# Patient Record
Sex: MALE | Race: WHITE
[De-identification: names, ages, dates, MRNs, and addresses within clinical notes are randomized per-mention and may not be internally consistent; named-entity substitution may affect disease eponyms.]

---

## 2019-11-09 ENCOUNTER — HOSPITAL ENCOUNTER (INPATIENT)
Dept: HOSPITAL 83 - ED | Age: 74
LOS: 3 days | Discharge: OTHER INSTITUTION | DRG: 553 | End: 2019-11-12
Attending: INTERNAL MEDICINE | Admitting: INTERNAL MEDICINE
Payer: MEDICARE

## 2019-11-09 VITALS — SYSTOLIC BLOOD PRESSURE: 112 MMHG | DIASTOLIC BLOOD PRESSURE: 59 MMHG

## 2019-11-09 VITALS — SYSTOLIC BLOOD PRESSURE: 142 MMHG | DIASTOLIC BLOOD PRESSURE: 58 MMHG

## 2019-11-09 VITALS — DIASTOLIC BLOOD PRESSURE: 60 MMHG | SYSTOLIC BLOOD PRESSURE: 150 MMHG

## 2019-11-09 VITALS — DIASTOLIC BLOOD PRESSURE: 51 MMHG | SYSTOLIC BLOOD PRESSURE: 135 MMHG

## 2019-11-09 VITALS — WEIGHT: 228.5 LBS | BODY MASS INDEX: 33.84 KG/M2 | HEIGHT: 68.98 IN

## 2019-11-09 DIAGNOSIS — E11.22: ICD-10-CM

## 2019-11-09 DIAGNOSIS — I48.0: ICD-10-CM

## 2019-11-09 DIAGNOSIS — E11.65: ICD-10-CM

## 2019-11-09 DIAGNOSIS — Z51.5: ICD-10-CM

## 2019-11-09 DIAGNOSIS — Z79.01: ICD-10-CM

## 2019-11-09 DIAGNOSIS — Z88.1: ICD-10-CM

## 2019-11-09 DIAGNOSIS — Z90.49: ICD-10-CM

## 2019-11-09 DIAGNOSIS — D64.9: ICD-10-CM

## 2019-11-09 DIAGNOSIS — Z95.1: ICD-10-CM

## 2019-11-09 DIAGNOSIS — M10.9: Primary | ICD-10-CM

## 2019-11-09 DIAGNOSIS — Z66: ICD-10-CM

## 2019-11-09 DIAGNOSIS — N17.0: ICD-10-CM

## 2019-11-09 DIAGNOSIS — Z79.899: ICD-10-CM

## 2019-11-09 DIAGNOSIS — Z88.8: ICD-10-CM

## 2019-11-09 DIAGNOSIS — J96.11: ICD-10-CM

## 2019-11-09 DIAGNOSIS — Z87.891: ICD-10-CM

## 2019-11-09 DIAGNOSIS — R53.1: ICD-10-CM

## 2019-11-09 DIAGNOSIS — Z83.2: ICD-10-CM

## 2019-11-09 DIAGNOSIS — Z79.82: ICD-10-CM

## 2019-11-09 DIAGNOSIS — R26.2: ICD-10-CM

## 2019-11-09 DIAGNOSIS — Z95.0: ICD-10-CM

## 2019-11-09 DIAGNOSIS — I25.2: ICD-10-CM

## 2019-11-09 DIAGNOSIS — E03.9: ICD-10-CM

## 2019-11-09 DIAGNOSIS — I50.9: ICD-10-CM

## 2019-11-09 DIAGNOSIS — E78.5: ICD-10-CM

## 2019-11-09 DIAGNOSIS — N18.3: ICD-10-CM

## 2019-11-09 DIAGNOSIS — E66.9: ICD-10-CM

## 2019-11-09 DIAGNOSIS — Z88.6: ICD-10-CM

## 2019-11-09 DIAGNOSIS — I13.0: ICD-10-CM

## 2019-11-09 DIAGNOSIS — I25.119: ICD-10-CM

## 2019-11-09 DIAGNOSIS — Z85.89: ICD-10-CM

## 2019-11-09 LAB
ALBUMIN SERPL-MCNC: 3.2 GM/DL (ref 3.1–4.5)
ALP SERPL-CCNC: 86 U/L (ref 45–117)
ALT SERPL W P-5'-P-CCNC: 16 U/L (ref 12–78)
AST SERPL-CCNC: 19 IU/L (ref 3–35)
BASOPHILS # BLD AUTO: 0 10*3/UL (ref 0–0.1)
BASOPHILS NFR BLD AUTO: 0.2 % (ref 0–1)
BUN SERPL-MCNC: 42 MG/DL (ref 7–24)
CHLORIDE SERPL-SCNC: 102 MMOL/L (ref 98–107)
CREAT SERPL-MCNC: 1.68 MG/DL (ref 0.7–1.3)
EOSINOPHIL # BLD AUTO: 0.1 10*3/UL (ref 0–0.4)
EOSINOPHIL # BLD AUTO: 1 % (ref 1–4)
ERYTHROCYTE [DISTWIDTH] IN BLOOD BY AUTOMATED COUNT: 15 % (ref 0–14.5)
HCT VFR BLD AUTO: 32.8 % (ref 42–52)
HGB BLD-MCNC: 10 G/DL (ref 14–18)
LYMPHOCYTES # BLD AUTO: 0.9 10*3/UL (ref 1.3–4.4)
LYMPHOCYTES NFR BLD AUTO: 9.1 % (ref 27–41)
MCH RBC QN AUTO: 27.9 PG (ref 27–31)
MCHC RBC AUTO-ENTMCNC: 30.5 G/DL (ref 33–37)
MCV RBC AUTO: 91.4 FL (ref 80–94)
MONOCYTES # BLD AUTO: 1.1 10*3/UL (ref 0.1–1)
MONOCYTES NFR BLD MANUAL: 11.9 % (ref 3–9)
NEUT #: 7.4 10*3/UL (ref 2.3–7.9)
NEUT %: 77.3 % (ref 47–73)
NRBC BLD QL AUTO: 0 10*3/UL (ref 0–0)
PLATELET # BLD AUTO: 199 10*3/UL (ref 130–400)
PMV BLD AUTO: 11.1 FL (ref 9.6–12.3)
POTASSIUM SERPL-SCNC: 4.7 MMOL/L (ref 3.5–5.1)
PROT SERPL-MCNC: 7.1 GM/DL (ref 6.4–8.2)
RBC # BLD AUTO: 3.59 10*6/UL (ref 4.5–5.9)
SODIUM SERPL-SCNC: 136 MMOL/L (ref 136–145)
WBC NRBC COR # BLD AUTO: 9.6 10*3/UL (ref 4.8–10.8)

## 2019-11-09 NOTE — NUR
MSADMTime: N
A 74  year old MALE admitted to 4E
under services of ARELI MOJICA DO.
Pt. arrived via bed from
ER.  Chief complaint: KNEE SWELLING/PAIN.
 
JEAN PIERRE HILL

## 2019-11-09 NOTE — NUR
PATIENT RESTING IN BED WITH EYES OPEN WATCHING TV. DENIES COMPLAINTS OF PAIN
OR DISCOMFORT. RESPIRATIONS REGULAR AND NON-LABORED ON 3L O2 VIA N/C. LUNGS
DIMINISHED BILATERALLY. BED ALARM ON. NO SIGNS OR SYMPTOMS OF DISTRESS NOTED.
VITAL SIGNS STABLE. ABDOMEN SOFTLY DISTENDED WITH NORMOACTIVE BOWEL SOUNDS. 3+
BILATERAL LOWER EXTREMITY EDEMA NOTED. WILL CONTINUE TO MONITOR. CALL LIGHT IN
REACH.

## 2019-11-10 VITALS — SYSTOLIC BLOOD PRESSURE: 112 MMHG | DIASTOLIC BLOOD PRESSURE: 49 MMHG

## 2019-11-10 VITALS — DIASTOLIC BLOOD PRESSURE: 55 MMHG | SYSTOLIC BLOOD PRESSURE: 120 MMHG

## 2019-11-10 VITALS — SYSTOLIC BLOOD PRESSURE: 114 MMHG | DIASTOLIC BLOOD PRESSURE: 53 MMHG

## 2019-11-10 VITALS — SYSTOLIC BLOOD PRESSURE: 117 MMHG | DIASTOLIC BLOOD PRESSURE: 62 MMHG

## 2019-11-10 VITALS — DIASTOLIC BLOOD PRESSURE: 50 MMHG

## 2019-11-10 LAB
25(OH)D3 SERPL-MCNC: 19.7 NG/ML (ref 30–100)
ALBUMIN SERPL-MCNC: 2.9 GM/DL (ref 3.1–4.5)
ALP SERPL-CCNC: 81 U/L (ref 45–117)
ALT SERPL W P-5'-P-CCNC: 17 U/L (ref 12–78)
APTT PPP: 56.9 SECONDS (ref 20–32.1)
AST SERPL-CCNC: 20 IU/L (ref 3–35)
BASOPHILS # BLD AUTO: 0 10*3/UL (ref 0–0.1)
BASOPHILS NFR BLD AUTO: 0.3 % (ref 0–1)
BUN SERPL-MCNC: 41 MG/DL (ref 7–24)
CHLORIDE SERPL-SCNC: 101 MMOL/L (ref 98–107)
CHOLEST SERPL-MCNC: 101 MG/DL (ref ?–200)
CREAT SERPL-MCNC: 1.62 MG/DL (ref 0.7–1.3)
EOSINOPHIL # BLD AUTO: 0.3 10*3/UL (ref 0–0.4)
EOSINOPHIL # BLD AUTO: 4.1 % (ref 1–4)
ERYTHROCYTE [DISTWIDTH] IN BLOOD BY AUTOMATED COUNT: 15.2 % (ref 0–14.5)
HCT VFR BLD AUTO: 30.3 % (ref 42–52)
HDLC SERPL-MCNC: 35 MG/DL (ref 40–60)
HGB BLD-MCNC: 9.3 G/DL (ref 14–18)
INR BLD: 2 (ref 2–3.5)
LDLC SERPL DIRECT ASSAY-MCNC: 43 MG/DL (ref 9–159)
LYMPHOCYTES # BLD AUTO: 1 10*3/UL (ref 1.3–4.4)
LYMPHOCYTES NFR BLD AUTO: 12.9 % (ref 27–41)
MCH RBC QN AUTO: 28.4 PG (ref 27–31)
MCHC RBC AUTO-ENTMCNC: 30.7 G/DL (ref 33–37)
MCV RBC AUTO: 92.7 FL (ref 80–94)
MONOCYTES # BLD AUTO: 1.1 10*3/UL (ref 0.1–1)
MONOCYTES NFR BLD MANUAL: 13.8 % (ref 3–9)
NEUT #: 5.4 10*3/UL (ref 2.3–7.9)
NEUT %: 68.5 % (ref 47–73)
NRBC BLD QL AUTO: 0 10*3/UL (ref 0–0)
PHOSPHATE SERPL-MCNC: 3.5 MG/DL (ref 2.5–4.9)
PLATELET # BLD AUTO: 202 10*3/UL (ref 130–400)
PMV BLD AUTO: 11.3 FL (ref 9.6–12.3)
POTASSIUM SERPL-SCNC: 4.7 MMOL/L (ref 3.5–5.1)
PROT SERPL-MCNC: 6.8 GM/DL (ref 6.4–8.2)
RBC # BLD AUTO: 3.27 10*6/UL (ref 4.5–5.9)
SODIUM SERPL-SCNC: 137 MMOL/L (ref 136–145)
T4 FREE SERPL-MCNC: 1.18 NG/DL (ref 0.76–1.46)
TRIGL SERPL-MCNC: 114 MG/DL (ref ?–150)
TSH SERPL DL<=0.005 MIU/L-ACNC: 3.47 UIU/ML (ref 0.36–4.75)
VITAMIN B12: 465 PG/ML (ref 247–911)
VLDLC SERPL CALC-MCNC: 23 MG/DL (ref 6–40)
WBC NRBC COR # BLD AUTO: 7.9 10*3/UL (ref 4.8–10.8)

## 2019-11-10 NOTE — NUR
PATIENT RESTING IN BED WITH EYES OPEN WATCHING TV. O2 ON AT 3L N/C.
RESPIRATIONS REGULAR AND NON-LABORED. IV FLUIDS INFUSING AT 100CC/HR X1 BAG.
DENIES COMPLAINTS OF PAIN OR DISCOMFORT AT THIS TIME. BED ALARM ON. PATIENT
USES URINAL AND IS ALSO INCONTINENT OF URINE. NO SIGNS OR SYMPTOMS OF DISTRESS
NOTED. WILL CONTINUE TO MONITOR. CALL LIGHT IN REACH.

## 2019-11-11 VITALS — DIASTOLIC BLOOD PRESSURE: 60 MMHG | SYSTOLIC BLOOD PRESSURE: 133 MMHG

## 2019-11-11 VITALS — DIASTOLIC BLOOD PRESSURE: 61 MMHG | SYSTOLIC BLOOD PRESSURE: 145 MMHG

## 2019-11-11 VITALS — DIASTOLIC BLOOD PRESSURE: 61 MMHG | SYSTOLIC BLOOD PRESSURE: 151 MMHG

## 2019-11-11 VITALS — DIASTOLIC BLOOD PRESSURE: 49 MMHG

## 2019-11-11 VITALS — SYSTOLIC BLOOD PRESSURE: 145 MMHG | DIASTOLIC BLOOD PRESSURE: 50 MMHG

## 2019-11-11 NOTE — NUR
PATIENT REFUSED TO LET ME DO A BEDSIDE GLUCOSE TEST ON HIM. SAID HE IS DONE
WITH EVERYTHING, THAT WE ARE DOING NOTHING FOR HIM AND HE IS LEAVING TODAY
BECAUSE HE IS JUST DETERIORATING AND GETTING WORSE. MEDICATED WTIH MORPHINE
FOR BACK PAIN AND BREATHING. WILL CONTINUE TO MONITOR. CALL LIGHT IN REACH.

## 2019-11-11 NOTE — NUR
PHYSICAL THERAPY
 
Nursing screen received and chart reviewed. Physical therapy order received
and completed. Thank you. Kecia Rowell,PT,DPT

## 2019-11-11 NOTE — NUR
Occupational Therapy evaluation completed on 4 with full eval to follow.
Precautions include fall risk,bed alarm,unsteady in standing, ww use for
transfers,right knee pain/edema,high complexity level 63007. Recommend OT per
POC and SNF to enable return home w/ wife at max ability to function. Thank
you.
Joann Sow OTR/l

## 2019-11-11 NOTE — NUR
Patient requesting a referral to [facility], contacted facility and faxed referral.
Requires a 3 night stay; waiting on review/acceptance.

## 2019-11-11 NOTE — NUR
PT REQUESTING "SLEEPING PILL" MEDICATED PER ORDER. WILL CONTINUE TO MONITOR
FOR RELIEF. VOICES NO OTHER CONCERNS AT THIS TIME. RESTING IN BED. CALL LIGHT
WITHIN REACH

## 2019-11-11 NOTE — NUR
in to talk to patient.
Patient states lives at home with wife.
There are few steps in the home.
Physician: benjamin limon
Pharmacy: [facility]: none
Patient's level of ADLs: MINIMAL ASSIST
Patient has working utilities: all working
DME:  home oxygen, portable tanks from [employer] [facility] [place], walker
Follow-up physician's appointment after d/c: will be made by hospitalist nurse
director upon discharge
Does patient want to access PORTAL?: no
Discharge plan discussed with patient, wife present, he lives at home with
wife, uses a walker for ambulation and requires minimal assistanc with adls,
wife stated lately he has be unable to ambulate, discussed with them a short
term skilled nursing for rehab and they both were in agreement,  patient stated
he had been at [facility] a few months ago and would like to return, asked for a
second choice also and they chose [facility] discharge planner
will make referrals, case management will follow.
 
OTILIO CERDA

## 2019-11-11 NOTE — NUR
OT NOTE
Attempted to see pt this P.M. for OT session and upon arrival pt was sitting
upright in the recliner while pt's wife was assisting with self care.
Requesting to check back at a later time/date, no treatment provided at this
time. Will continue with POC as able.
 
ADAM Savage/HUBERT

## 2019-11-11 NOTE — NUR
PHYSICAL THERAPY
PT leidy completed full report to follow, reccomend SNF at discharge discussed
with pt and wife. Pt is moderate level of [id number]. PT to work on
transfers, amb, strengthening, and ROM.
Thank you.
Joselin Valentin PT

## 2019-11-12 VITALS — DIASTOLIC BLOOD PRESSURE: 55 MMHG | SYSTOLIC BLOOD PRESSURE: 136 MMHG

## 2019-11-12 VITALS — DIASTOLIC BLOOD PRESSURE: 63 MMHG

## 2019-11-12 NOTE — NUR
Patient sleeping. Respirations relaxed and easy. Siderails up . Wheellocks
on. Bed alarm on. Call light within reach.
 
HISSOM,FUNMILAYO

## 2019-11-12 NOTE — NUR
Patient resting quietly in bed
with no c/o discomfort. Respirations easy and regular.
Vital signs stable. No overt distress. Bed alarm on. Call light within reach.
FUNMILAYO AREVALO

## 2019-11-12 NOTE — NUR
patient is discharged to [facility]; transportation scheduled for 12 noon with [facility]. NH, nursing/mcmanus clerk and wife all notified.

## 2019-11-12 NOTE — NUR
PHYSICAL THERAPY
 
Patient seen this am 1:1 for therapy visit and was sitting up in bedside chair
upon therapist arrival. Patient identified by name /  and presented with
continuos O2-3L via NC.  Patient voices no new c/o's and performed sit to
stand transfer from low chair surface with MOD A. Patient demonstrated
increased difficulty upon initial rise secondary to POOR transfer technique
and needed v/c for proper hand placement. Patient improved to MIN A upon
second trial and ambulated with use of wh walker, CGA, 40'x 1, demonstrating
"waddling" gait pattern, decreased stride and unsteady balance during all
turns. Patient was also very cautious for fear of falling and returned to
bedside chair with mild fatigue. Patient remained in chair with call light,
tray table and telephone. Will continue per POC as tolerated, total treatment
time 14 minutes.  Francis Stout, PTA

## 2019-11-12 NOTE — NUR
Patient has been accepted to [facility], 3 night stay complete. Patient can go when
medically stable for discharge.

## 2019-11-12 NOTE — NUR
Discharge instructions reviewed with patient/family. Patient receptive and
verbalizes understanding. Follow-up care arranged. Written instructions given
to patient/family.
TASHA JENSEN

## 2019-11-12 NOTE — NUR
OT NOTE
Pt was seen this A.M. 1:1 for 20 minute OT session. Upon arrival pt was
sitting upright in the recliner. Pt identified by name and  and had no
complaints at this time. Pt presented to therapy with continuous 3L-O2 via NC
which he remained on throughout the entire session. Pt completed sit to stand
transfer from chair level with modA and use of w/w for UE support. Educated
pt on proper hand placement and improving his technique for increased I.
Challenged pt's static standing tolerance needed for increased I in self care
tasks and functional transfers, pt was able to tolerate aprox 3 mnutes at a
time before sitting due to fatigue. Pt then completed functional mobility to
the bathroom and back with CGA and use of w/w. Throughout pt required verbal
prompts for taking bigger steps and was educated on safe turning technique due
to LOB occuring to the R while turning that required Jaden to correct. Pt
required two standing rest breaks throughout due to fatigue. Pt was left
sitting upright in the recliner with call light in hand, tray table in place,
and phone in reach. COntinue with rec D/C plan to SNF.
 
ADAM Savage/L

## 2019-11-12 NOTE — NUR
case management visits with patient, he will be discharged to [facility] today,
discharge planner will make transportation arrangements, no other needs at
this time

## 2019-11-12 NOTE — NUR
SECOND ATTEMPT TO GIVE REPORT WAS SENT TO VOICEMAIL. [employer]AIL LEFT WITH
CALLBACK NUMBER ON "SUPERVISOR [employer]IOR'S" LINE.

## 2019-11-13 NOTE — NUR
OCCUPATIONAL THERAPY CO-SIGN
 
I approve of the Occupational Therapy notes written above.
REBECCA TOMAS OTR/HUBERT

## 2019-11-13 NOTE — NUR
PHYSICAL THERAPY CO-SIGN
 
 
I approve of the Physical Therapy notes written above.
 
Joselin Valentin PT

## 2020-02-29 ENCOUNTER — HOSPITAL ENCOUNTER (INPATIENT)
Dept: HOSPITAL 83 - ED | Age: 75
LOS: 4 days | Discharge: OTHER INSTITUTION | DRG: 177 | End: 2020-03-04
Attending: INTERNAL MEDICINE | Admitting: INTERNAL MEDICINE
Payer: MEDICARE

## 2020-02-29 VITALS — SYSTOLIC BLOOD PRESSURE: 136 MMHG | DIASTOLIC BLOOD PRESSURE: 66 MMHG

## 2020-02-29 VITALS — DIASTOLIC BLOOD PRESSURE: 47 MMHG

## 2020-02-29 VITALS — WEIGHT: 216.31 LBS | BODY MASS INDEX: 30.97 KG/M2 | HEIGHT: 70 IN

## 2020-02-29 VITALS — DIASTOLIC BLOOD PRESSURE: 80 MMHG

## 2020-02-29 VITALS — DIASTOLIC BLOOD PRESSURE: 40 MMHG | SYSTOLIC BLOOD PRESSURE: 110 MMHG

## 2020-02-29 VITALS — DIASTOLIC BLOOD PRESSURE: 53 MMHG

## 2020-02-29 VITALS — SYSTOLIC BLOOD PRESSURE: 131 MMHG | DIASTOLIC BLOOD PRESSURE: 51 MMHG

## 2020-02-29 VITALS — DIASTOLIC BLOOD PRESSURE: 54 MMHG

## 2020-02-29 DIAGNOSIS — E53.8: ICD-10-CM

## 2020-02-29 DIAGNOSIS — Z84.89: ICD-10-CM

## 2020-02-29 DIAGNOSIS — E11.22: ICD-10-CM

## 2020-02-29 DIAGNOSIS — I13.0: ICD-10-CM

## 2020-02-29 DIAGNOSIS — D68.59: ICD-10-CM

## 2020-02-29 DIAGNOSIS — E66.9: ICD-10-CM

## 2020-02-29 DIAGNOSIS — E11.65: ICD-10-CM

## 2020-02-29 DIAGNOSIS — J43.9: ICD-10-CM

## 2020-02-29 DIAGNOSIS — I25.2: ICD-10-CM

## 2020-02-29 DIAGNOSIS — Z66: ICD-10-CM

## 2020-02-29 DIAGNOSIS — I25.10: ICD-10-CM

## 2020-02-29 DIAGNOSIS — I50.9: ICD-10-CM

## 2020-02-29 DIAGNOSIS — E03.9: ICD-10-CM

## 2020-02-29 DIAGNOSIS — Z90.49: ICD-10-CM

## 2020-02-29 DIAGNOSIS — E87.5: ICD-10-CM

## 2020-02-29 DIAGNOSIS — N18.3: ICD-10-CM

## 2020-02-29 DIAGNOSIS — Z87.891: ICD-10-CM

## 2020-02-29 DIAGNOSIS — Z95.1: ICD-10-CM

## 2020-02-29 DIAGNOSIS — E44.0: ICD-10-CM

## 2020-02-29 DIAGNOSIS — J96.21: ICD-10-CM

## 2020-02-29 DIAGNOSIS — I48.0: ICD-10-CM

## 2020-02-29 DIAGNOSIS — Z51.5: ICD-10-CM

## 2020-02-29 DIAGNOSIS — D64.9: ICD-10-CM

## 2020-02-29 DIAGNOSIS — R26.2: ICD-10-CM

## 2020-02-29 DIAGNOSIS — Z88.8: ICD-10-CM

## 2020-02-29 DIAGNOSIS — Z88.6: ICD-10-CM

## 2020-02-29 DIAGNOSIS — Z79.01: ICD-10-CM

## 2020-02-29 DIAGNOSIS — N17.0: ICD-10-CM

## 2020-02-29 DIAGNOSIS — Z99.81: ICD-10-CM

## 2020-02-29 DIAGNOSIS — Z85.89: ICD-10-CM

## 2020-02-29 DIAGNOSIS — Z91.81: ICD-10-CM

## 2020-02-29 DIAGNOSIS — I48.21: ICD-10-CM

## 2020-02-29 DIAGNOSIS — Z79.899: ICD-10-CM

## 2020-02-29 DIAGNOSIS — Z79.82: ICD-10-CM

## 2020-02-29 DIAGNOSIS — Z88.1: ICD-10-CM

## 2020-02-29 DIAGNOSIS — J15.6: Primary | ICD-10-CM

## 2020-02-29 LAB
ALBUMIN SERPL-MCNC: 3 GM/DL (ref 3.1–4.5)
ALP SERPL-CCNC: 116 U/L (ref 45–117)
ALT SERPL W P-5'-P-CCNC: 19 U/L (ref 12–78)
APTT PPP: 65.9 SECONDS (ref 20–32.1)
AST SERPL-CCNC: 19 IU/L (ref 3–35)
BASOPHILS # BLD AUTO: 0 10*3/UL (ref 0–0.1)
BASOPHILS NFR BLD AUTO: 0.1 % (ref 0–1)
BUN SERPL-MCNC: 57 MG/DL (ref 7–24)
CHLORIDE SERPL-SCNC: 106 MMOL/L (ref 98–107)
CREAT SERPL-MCNC: 2.23 MG/DL (ref 0.7–1.3)
EOSINOPHIL # BLD AUTO: 0.2 10*3/UL (ref 0–0.4)
EOSINOPHIL # BLD AUTO: 2.9 % (ref 1–4)
ERYTHROCYTE [DISTWIDTH] IN BLOOD BY AUTOMATED COUNT: 15.9 % (ref 0–14.5)
HCT VFR BLD AUTO: 29.4 % (ref 42–52)
HGB BLD-MCNC: 8.6 G/DL (ref 14–18)
INR BLD: 2.6 (ref 2–3.5)
LYMPHOCYTES # BLD AUTO: 1 10*3/UL (ref 1.3–4.4)
LYMPHOCYTES NFR BLD AUTO: 13 % (ref 27–41)
MCH RBC QN AUTO: 26.8 PG (ref 27–31)
MCHC RBC AUTO-ENTMCNC: 29.3 G/DL (ref 33–37)
MCV RBC AUTO: 91.6 FL (ref 80–94)
MONOCYTES # BLD AUTO: 0.8 10*3/UL (ref 0.1–1)
MONOCYTES NFR BLD MANUAL: 11.1 % (ref 3–9)
NEUT #: 5.3 10*3/UL (ref 2.3–7.9)
NEUT %: 72.2 % (ref 47–73)
NRBC BLD QL AUTO: 0 % (ref 0–0)
PLATELET # BLD AUTO: 261 10*3/UL (ref 130–400)
PMV BLD AUTO: 10.3 FL (ref 9.6–12.3)
POTASSIUM SERPL-SCNC: 5.2 MMOL/L (ref 3.5–5.1)
PROT SERPL-MCNC: 6.9 GM/DL (ref 6.4–8.2)
RBC # BLD AUTO: 3.21 10*6/UL (ref 4.5–5.9)
SODIUM SERPL-SCNC: 138 MMOL/L (ref 136–145)
TROPONIN I SERPL-MCNC: < 0.015 NG/ML (ref ?–0.04)
WBC NRBC COR # BLD AUTO: 7.3 10*3/UL (ref 4.8–10.8)

## 2020-02-29 NOTE — NUR
Time: 1520
A 74  year old MALE admitted to 5E
under services of DAVIDSON HESS DO.
Pt. arrived via being carried from
ER.  Chief complaint: CHF.
 
MARIA TERESA HUBBARD

## 2020-02-29 NOTE — NUR
PLACED PT ON NC 4L TO SEE IF OXYGEN WOULD STAY GOOD BUT WITHIN 5 MINUTES
OXYGEN WAS BACK DOWN TO 88 NO REBREATHER RE-APPLIED FAMILY AT BEDSIDE

## 2020-02-29 NOTE — NUR
PATIENT SITTING UP IN BED AT THIS TIME. PATIENT DENIES ANY SHORTNESS OF
BREATHE WHILE ON THE OPTIFLOW. PATIENT HAS 3+ BLE NOTED AND REPORTS BREATHING
HAD BEEN WORSENING FOR OVER A WEEK. PATIENT HAS A PEREZ WITH RC URINE
OBSERVED. NO PAIN OR DISTRESS NOTED THROUGHOUT ASSESSMENT.
CALL LIGHT WITHIN REACH. SEE ASSESSMENT

## 2020-02-29 NOTE — NUR
PT WIFE REQUESTED PEREZ CATH BE PLACED HE IS GOING TO BE GETTING IV LASIX  IS
AWARE AND OK WITH PEREZ BEING PUT IN

## 2020-03-01 VITALS — SYSTOLIC BLOOD PRESSURE: 103 MMHG | DIASTOLIC BLOOD PRESSURE: 47 MMHG

## 2020-03-01 VITALS — SYSTOLIC BLOOD PRESSURE: 113 MMHG | DIASTOLIC BLOOD PRESSURE: 42 MMHG

## 2020-03-01 VITALS — DIASTOLIC BLOOD PRESSURE: 39 MMHG

## 2020-03-01 VITALS — SYSTOLIC BLOOD PRESSURE: 127 MMHG | DIASTOLIC BLOOD PRESSURE: 50 MMHG

## 2020-03-01 LAB
25(OH)D3 SERPL-MCNC: 27.9 NG/ML (ref 30–100)
ALBUMIN SERPL-MCNC: 2.8 GM/DL (ref 3.1–4.5)
ALP SERPL-CCNC: 93 U/L (ref 45–117)
ALT SERPL W P-5'-P-CCNC: 16 U/L (ref 12–78)
AST SERPL-CCNC: 22 IU/L (ref 3–35)
BASE EXCESS BLDA CALC-SCNC: 2.2 MMOL/L (ref -2–2)
BASOPHILS # BLD AUTO: 0 10*3/UL (ref 0–0.1)
BASOPHILS NFR BLD AUTO: 0.1 % (ref 0–1)
BUN SERPL-MCNC: 53 MG/DL (ref 7–24)
CHLORIDE SERPL-SCNC: 106 MMOL/L (ref 98–107)
CHOLEST SERPL-MCNC: 74 MG/DL (ref ?–200)
CREAT SERPL-MCNC: 1.77 MG/DL (ref 0.7–1.3)
EOSINOPHIL # BLD AUTO: 0.3 10*3/UL (ref 0–0.4)
EOSINOPHIL # BLD AUTO: 4.4 % (ref 1–4)
ERYTHROCYTE [DISTWIDTH] IN BLOOD BY AUTOMATED COUNT: 15.8 % (ref 0–14.5)
HCO3 BLDA-SCNC: 27 MMOL/L (ref 22–26)
HCT VFR BLD AUTO: 27.1 % (ref 42–52)
HDLC SERPL-MCNC: 32 MG/DL (ref 40–60)
HGB BLD-MCNC: 8 G/DL (ref 14–18)
LDLC SERPL DIRECT ASSAY-MCNC: 23 MG/DL (ref 9–159)
LYMPHOCYTES # BLD AUTO: 0.8 10*3/UL (ref 1.3–4.4)
LYMPHOCYTES NFR BLD AUTO: 11.7 % (ref 27–41)
MCH RBC QN AUTO: 26.8 PG (ref 27–31)
MCHC RBC AUTO-ENTMCNC: 29.5 G/DL (ref 33–37)
MCV RBC AUTO: 90.9 FL (ref 80–94)
MONOCYTES # BLD AUTO: 0.9 10*3/UL (ref 0.1–1)
MONOCYTES NFR BLD MANUAL: 13 % (ref 3–9)
NEUT #: 4.8 10*3/UL (ref 2.3–7.9)
NEUT %: 70.5 % (ref 47–73)
NRBC BLD QL AUTO: 0 10*3/UL (ref 0–0)
PCO2 BLDA: 44 MMHG (ref 35–45)
PH BLDA: 7.4 [PH] (ref 7.35–7.45)
PHOSPHATE SERPL-MCNC: 3.5 MG/DL (ref 2.5–4.9)
PLATELET # BLD AUTO: 265 10*3/UL (ref 130–400)
PMV BLD AUTO: 11.2 FL (ref 9.6–12.3)
PO2 BLDA: 98 MMHG (ref 80–90)
POTASSIUM SERPL-SCNC: 4.7 MMOL/L (ref 3.5–5.1)
PROT SERPL-MCNC: 6.2 GM/DL (ref 6.4–8.2)
RBC # BLD AUTO: 2.98 10*6/UL (ref 4.5–5.9)
SAO2 % BLDA: 98 % (ref 95–97)
SODIUM SERPL-SCNC: 140 MMOL/L (ref 136–145)
T4 FREE SERPL-MCNC: 1.33 NG/DL (ref 0.76–1.46)
TRIGL SERPL-MCNC: 96 MG/DL (ref ?–150)
TSH SERPL DL<=0.005 MIU/L-ACNC: 0.41 UIU/ML (ref 0.36–4.75)
VITAMIN B12: 435 PG/ML (ref 247–911)
VLDLC SERPL CALC-MCNC: 19 MG/DL (ref 6–40)
WBC NRBC COR # BLD AUTO: 6.9 10*3/UL (ref 4.8–10.8)

## 2020-03-01 NOTE — NUR
PLACED BIPAP IN PTS ROOM. PT REFUSED TO WEAR TILL TONIGHT. HE HAD FAMILY
MEMBERS IN ROOM VISITING. PTS SPO2 97% ON OPTIFLOW 70%/40 L, TITRATED TO
60%/40L.

## 2020-03-01 NOTE — NUR
PHYSICAL THERAPY
 
PT EVAL COMPLETED ON LEVEL 5: FULL EVAL TO FOLLOW. RECOMMEND PT WHILE HERE TO
ADDRESS DECREASED STRNEGTH, ENDURANCE, BALANCE AND THUS DECREASED FUNCTIONAL
MOBILITY. PT EVAL IS MODERATE COMPLEXITY: 53952.D/C RECOMMENDATIONS AT THIS
TIME ARE FOR SNF BASED ON CURRENT FUNCTIONAL STATUS. THANK YOU FOR REFERRAL
NAOMI MERIDA PT

## 2020-03-02 VITALS — SYSTOLIC BLOOD PRESSURE: 117 MMHG | DIASTOLIC BLOOD PRESSURE: 62 MMHG

## 2020-03-02 VITALS — DIASTOLIC BLOOD PRESSURE: 47 MMHG | SYSTOLIC BLOOD PRESSURE: 106 MMHG

## 2020-03-02 VITALS — DIASTOLIC BLOOD PRESSURE: 50 MMHG

## 2020-03-02 VITALS — DIASTOLIC BLOOD PRESSURE: 44 MMHG

## 2020-03-02 VITALS — DIASTOLIC BLOOD PRESSURE: 42 MMHG

## 2020-03-02 LAB
ALBUMIN SERPL-MCNC: 2.6 GM/DL (ref 3.1–4.5)
ALP SERPL-CCNC: 86 U/L (ref 45–117)
ALT SERPL W P-5'-P-CCNC: 14 U/L (ref 12–78)
AST SERPL-CCNC: 21 IU/L (ref 3–35)
BASOPHILS # BLD AUTO: 0 10*3/UL (ref 0–0.1)
BASOPHILS NFR BLD AUTO: 0.3 % (ref 0–1)
BUN SERPL-MCNC: 58 MG/DL (ref 7–24)
CHLORIDE SERPL-SCNC: 105 MMOL/L (ref 98–107)
CREAT SERPL-MCNC: 1.79 MG/DL (ref 0.7–1.3)
EOSINOPHIL # BLD AUTO: 0.3 10*3/UL (ref 0–0.4)
EOSINOPHIL # BLD AUTO: 4.5 % (ref 1–4)
ERYTHROCYTE [DISTWIDTH] IN BLOOD BY AUTOMATED COUNT: 15.9 % (ref 0–14.5)
HCT VFR BLD AUTO: 24.9 % (ref 42–52)
HGB BLD-MCNC: 7.3 G/DL (ref 14–18)
INR BLD: 2.3 (ref 2–3.5)
LYMPHOCYTES # BLD AUTO: 0.9 10*3/UL (ref 1.3–4.4)
LYMPHOCYTES NFR BLD AUTO: 12.3 % (ref 27–41)
MCH RBC QN AUTO: 27.2 PG (ref 27–31)
MCHC RBC AUTO-ENTMCNC: 29.3 G/DL (ref 33–37)
MCV RBC AUTO: 92.9 FL (ref 80–94)
MONOCYTES # BLD AUTO: 0.9 10*3/UL (ref 0.1–1)
MONOCYTES NFR BLD MANUAL: 12 % (ref 3–9)
NEUT #: 5.3 10*3/UL (ref 2.3–7.9)
NEUT %: 70.5 % (ref 47–73)
NRBC BLD QL AUTO: 0 % (ref 0–0)
PLATELET # BLD AUTO: 240 10*3/UL (ref 130–400)
PMV BLD AUTO: 11.4 FL (ref 9.6–12.3)
POTASSIUM SERPL-SCNC: 4.6 MMOL/L (ref 3.5–5.1)
PROT SERPL-MCNC: 6 GM/DL (ref 6.4–8.2)
RBC # BLD AUTO: 2.68 10*6/UL (ref 4.5–5.9)
SODIUM SERPL-SCNC: 141 MMOL/L (ref 136–145)
URATE SERPL-MCNC: 4.6 MG/DL (ref 3.5–7.2)
WBC NRBC COR # BLD AUTO: 7.5 10*3/UL (ref 4.8–10.8)

## 2020-03-02 NOTE — NUR
Occupational therapy orders and nursing screen received. Will follow up with
the patient for completion of the OT evaluation. Thank you.
 
Fabiola Crump, OTR/L

## 2020-03-02 NOTE — NUR
LISW faxed referral on this date to [facility]. Patient has been accepted for SNF
days. Step down to facility discussed with patient and wife who are
agreeable. LISW will continue to follow and provide any support as needed.

## 2020-03-02 NOTE — NUR
in to talk to patient.
Patient states lives at home with wife.
There are no steps in the home.
Physician:
Pharmacy: [facility]
Home health services: none
Patient's level of ADLs: MODERATE ASSIST
Patient has working utilities: all working
DME: hospital bed, lift chair, bipap that he doesn't wear, home oxygen portable
tanks from [facility]
Follow-up physician's appointment after d/c: will be made by hospitalist nurse
director upon discharge
Does patient want to access PORTAL?: no
Discharge plan discussed with patient and wife, wife states he lives at home
with her, has a hospital bed, lift chair, home oxygen and a bipap he doesn't
wear. wife stated he hasn't ambulated much in a long while, discussed with them
a short term skilled nursing and they were receptive to this, given choice of
facilities they chose [facility], greg planner will send a referral to [facility] for
when patient is medically stable for discharge.
 
OTILIO CERDA

## 2020-03-02 NOTE — NUR
PHYSICAL THERAPY
 
Patient seen this am 1:1 for therapy visit and was resting supine in bed upon
therapist arrival. Patient identified by name /  and was joined by his Wife
this session. Patient presented with Hi Flow O2, recording resting SpO2 94%,
HR 69 bpm. Patient transfers supine to sit EOB with MOD A, tolerating static
EOB sit 8-9 minutes, SBA. Patient able to perform seated B LE therex, all
planes x 10 reps each and completed sit to stand transfer, HHA/MOD. Patient
side step to R 3-4 steps for HOB positioning and returned to supine in bed
with MOD A. Patient remained in bed with HOB elevated, call light, tray table,
telephone and bed alarm for safety. Patient reports increased fatigue
following treatment and will continue per POC as tolerated, total treatment
time 23 minutes.   Francis Stout, PTA

## 2020-03-02 NOTE — NUR
PHYSICAL THERAPY
 
Screen and eval received pt has been evaluated and is on caseload thank you
 
 
Joselin Valentin PT

## 2020-03-02 NOTE — NUR
PT SAT IS 96% ON 55% FIO2 AND 40L/M. NO C/O. PT STATED TO LEAVE HIS O2 DEVICE.
DOES NOT WANT A MASK. RN INFORMED.

## 2020-03-03 VITALS — DIASTOLIC BLOOD PRESSURE: 42 MMHG

## 2020-03-03 VITALS — SYSTOLIC BLOOD PRESSURE: 118 MMHG | DIASTOLIC BLOOD PRESSURE: 52 MMHG

## 2020-03-03 VITALS — DIASTOLIC BLOOD PRESSURE: 77 MMHG

## 2020-03-03 VITALS — DIASTOLIC BLOOD PRESSURE: 52 MMHG | SYSTOLIC BLOOD PRESSURE: 118 MMHG

## 2020-03-03 VITALS — DIASTOLIC BLOOD PRESSURE: 47 MMHG

## 2020-03-03 LAB
BASOPHILS # BLD AUTO: 0 10*3/UL (ref 0–0.1)
BASOPHILS NFR BLD AUTO: 0.2 % (ref 0–1)
EOSINOPHIL # BLD AUTO: 0.4 10*3/UL (ref 0–0.4)
EOSINOPHIL # BLD AUTO: 4.2 % (ref 1–4)
ERYTHROCYTE [DISTWIDTH] IN BLOOD BY AUTOMATED COUNT: 15.9 % (ref 0–14.5)
HCT VFR BLD AUTO: 27.4 % (ref 42–52)
HGB BLD-MCNC: 8.2 G/DL (ref 14–18)
INR BLD: 2.1 (ref 2–3.5)
LYMPHOCYTES # BLD AUTO: 0.9 10*3/UL (ref 1.3–4.4)
LYMPHOCYTES NFR BLD AUTO: 9.7 % (ref 27–41)
MCH RBC QN AUTO: 27.7 PG (ref 27–31)
MCHC RBC AUTO-ENTMCNC: 29.9 G/DL (ref 33–37)
MCV RBC AUTO: 92.6 FL (ref 80–94)
MONOCYTES # BLD AUTO: 1.1 10*3/UL (ref 0.1–1)
MONOCYTES NFR BLD MANUAL: 12.3 % (ref 3–9)
NEUT #: 6.5 10*3/UL (ref 2.3–7.9)
NEUT %: 73.3 % (ref 47–73)
NRBC BLD QL AUTO: 0 10*3/UL (ref 0–0)
PLATELET # BLD AUTO: 251 10*3/UL (ref 130–400)
PMV BLD AUTO: 9.9 FL (ref 9.6–12.3)
RBC # BLD AUTO: 2.96 10*6/UL (ref 4.5–5.9)
WBC NRBC COR # BLD AUTO: 8.9 10*3/UL (ref 4.8–10.8)

## 2020-03-03 NOTE — NUR
PHYSICAL THERAPY
TREATMENT TIME:  2:05 PM    OUT
Patient presented to therapy in sitting at EOB with his wife visiting in room
with patient. Patient gives informed consent for treatment. Patient was
identified by name and  on wristband. Patient is on 5 liters of spO2 VIA
NASAL CANULA. Patient performed sit to stand from EOB with MAX A X 1-2.
Patient required verbal cues for pushing off the bed with hands. Patient had
to sit on EOB in < 20 seconds due to his knees buckling. Patient attempted sit
to stand again from EOB with MAX A X 1-2. Patient stood for < 15 seconds and
then had to sit EOB. Patient sit to stand from EOB with MAX A X 1-2. Patient
again required verbal cues for proper hand placement and locking knees into
extension upon standing. Patient performed seated bilateral LAQs x 10 reps
each for strengthening. PATIENT QUICKLY FATIGUED AND HAD OT STOP THERAPY
SESSION. Patient was left in sitting on EOB with tray table in front of him, 5
liters of spO2 connected to wall outlet and call light within reach. Patient's
wife was left in room visiting with patient. Patient's bed alarm was
activated. Patient was 1:1 with this PTA for 20 minutes total.
                                            CLAUS SHELTON PTA

## 2020-03-03 NOTE — NUR
PT. TAKEN OFF OPTIFLOW AND PLACED ON 6L NC.  PT. TOLERATED WELL WITH NO
INCREASED WOB OR C/O SHORTNESS OF BREATH. SPO2 98% ON 6L. DECREASED FLOW TO 5L
AS PRESCRIBED AT HOME PER WIFE. SPO2 REMAINED AT 94-97%.  WILL CONTINUE TO
MONITOR. RN NOTIFIED.

## 2020-03-03 NOTE — NUR
PHYSICAL THERAPY
Patient is eating breakfast at this time. Will check back later with patient.
                                                 CLAUS SHELTON PTA

## 2020-03-04 VITALS — SYSTOLIC BLOOD PRESSURE: 119 MMHG | DIASTOLIC BLOOD PRESSURE: 50 MMHG

## 2020-03-04 VITALS — DIASTOLIC BLOOD PRESSURE: 50 MMHG | SYSTOLIC BLOOD PRESSURE: 136 MMHG

## 2020-03-04 VITALS — SYSTOLIC BLOOD PRESSURE: 124 MMHG | DIASTOLIC BLOOD PRESSURE: 61 MMHG

## 2020-03-04 LAB
ALBUMIN SERPL-MCNC: 2.7 GM/DL (ref 3.1–4.5)
ALP SERPL-CCNC: 85 U/L (ref 45–117)
ALT SERPL W P-5'-P-CCNC: 18 U/L (ref 12–78)
AST SERPL-CCNC: 19 IU/L (ref 3–35)
BASOPHILS # BLD AUTO: 0 10*3/UL (ref 0–0.1)
BASOPHILS NFR BLD AUTO: 0.2 % (ref 0–1)
BUN SERPL-MCNC: 56 MG/DL (ref 7–24)
CHLORIDE SERPL-SCNC: 98 MMOL/L (ref 98–107)
CREAT SERPL-MCNC: 1.79 MG/DL (ref 0.7–1.3)
EOSINOPHIL # BLD AUTO: 0.3 10*3/UL (ref 0–0.4)
EOSINOPHIL # BLD AUTO: 3.5 % (ref 1–4)
ERYTHROCYTE [DISTWIDTH] IN BLOOD BY AUTOMATED COUNT: 15.8 % (ref 0–14.5)
HCT VFR BLD AUTO: 25.6 % (ref 42–52)
HGB BLD-MCNC: 7.6 G/DL (ref 14–18)
INR BLD: 1.9 (ref 2–3.5)
LYMPHOCYTES # BLD AUTO: 0.7 10*3/UL (ref 1.3–4.4)
LYMPHOCYTES NFR BLD AUTO: 7.8 % (ref 27–41)
MCH RBC QN AUTO: 27.4 PG (ref 27–31)
MCHC RBC AUTO-ENTMCNC: 29.7 G/DL (ref 33–37)
MCV RBC AUTO: 92.4 FL (ref 80–94)
MONOCYTES # BLD AUTO: 0.8 10*3/UL (ref 0.1–1)
MONOCYTES NFR BLD MANUAL: 9 % (ref 3–9)
NEUT #: 7.2 10*3/UL (ref 2.3–7.9)
NEUT %: 79.2 % (ref 47–73)
NRBC BLD QL AUTO: 0 10*3/UL (ref 0–0)
PLATELET # BLD AUTO: 252 10*3/UL (ref 130–400)
PMV BLD AUTO: 10.8 FL (ref 9.6–12.3)
POTASSIUM SERPL-SCNC: 4.5 MMOL/L (ref 3.5–5.1)
PROT SERPL-MCNC: 6.3 GM/DL (ref 6.4–8.2)
RBC # BLD AUTO: 2.77 10*6/UL (ref 4.5–5.9)
SODIUM SERPL-SCNC: 136 MMOL/L (ref 136–145)
WBC NRBC COR # BLD AUTO: 9.1 10*3/UL (ref 4.8–10.8)

## 2020-03-04 NOTE — NUR
LISW setup transfer and discharge. Patient to discharge to [facility] on this date.
Unit notified, ambulance setup, nursing facility notified with paperwork.
Discharge to occur 1330.

## 2020-03-04 NOTE — NUR
hospital exemption completed for [facility]. patient accepted, 3 night stay
complete. Patient is ok to go when medically stable for discharge.

## 2020-03-04 NOTE — NUR
Occupational Therapy evaluation completed on five with full evaluation to
follow.  Recommend occupational therapy per plan of care and SNF  upon
discharge.  Thank you for this referral.
 
Fabiola Crump OTR/L

## 2020-03-04 NOTE — NUR
PATIENT UNABLE TO URINATE SINCE HAVING PEREZ REMOVED THIS AFTERNOON. BLADDERED
SCANNED PATIENT FOR >999. NOTIFIED DR. OTTO. STATES TO STRAIGHT CATH PATIENT

## 2020-03-04 NOTE — NUR
PHYSICAL THERAPY
 
Patient seen this am 1:1 for therapy visit and was resting supine in bed upon
therapist arrival. Patient identified by name / , reporting no new c/o's at
this time. Patients Wife and Daughter were both present for observation this
session as patient presented with contiuous O2-5L via NC. Patient resting SpO2
was 92%, HR 60 bpm prior to treatment as patient transfers supine to sit EOB
with MOD, tolerating several minutes static EOB sit SBA. Patient performed
several sit to stand transfers, MIN A, use of wh walker, standing support,
tolerating < 1 minute static stand each trial. Patient second /third trial was
MOD A x 2 secondary to increased fatigue, recording 93% SpO2,  bpm.
Patient returned to supine in bed and remained with call light, tray table,
bed alarm for safety. Patient SpO2 dropped to 88%, then following rest break
returned to 93% SpO2, HR 67 bpm.  Will continue per POC as tolerated, total
treatment time 15 minutes.     Francis Stout, PTA

## 2020-03-04 NOTE — NUR
BLADDER SCANNED PATIENT FOR AROUND 480CC, STRAIGHT CATHED PATIENT FOR 450CC OF
SRAW URINE. PATIENT TOELRATED WELL. WILL CONTINUE TO MONITOR.

## 2020-03-04 NOTE — NUR
STRAIGHT CATHED PATIETN FOR 1300CC OF STRAW COLORED URINE. PATIENT TOLERATED
WELL. WILL CONTINUE TO MONITOR.

## 2020-03-04 NOTE — NUR
PT DISCHARGED TO [facility] AT THIS TIME. CHRIS INTACT. [facility]PLOCK REMOVED. PT
TRANSPORTED VIA [facility]. VSS. REPORT GIVEN TO RECIEVING UNIT.